# Patient Record
(demographics unavailable — no encounter records)

---

## 2025-04-24 NOTE — HISTORY OF PRESENT ILLNESS
[Eats healthy meals and snacks] : eats healthy meals and snacks [Eats meals with family] : eats meals with family [Normal] : Normal [Has Friends] : has friends [Grade ___] : Grade [unfilled] [Adequate social interactions] : adequate social interactions [No difficulties with Homework] : no difficulties with homework [No] : No cigarette smoke exposure [Brushing teeth twice/d] : brushing teeth twice per day [Yes] : Patient goes to dentist yearly [Toothpaste] : Primary Fluoride Source: Toothpaste [Playtime (60 min/d)] : playtime 60 min a day [Participates in after-school activities] : participates in after-school activities [Appropiate parent-child-sibling interaction] : appropriate parent-child-sibling interaction [Appropriately restrained in motor vehicle] : appropriately restrained in motor vehicle [Supervised outdoor play] : supervised outdoor play [Supervised around water] : supervised around water [Wears helmet and pads] : wears helmet and pads [Parent knows child's friends] : parent knows child's friends [Family discusses home emergency plan] : family discusses home emergency plan [Up to date] : Up to date [Exposure to electronic nicotine delivery system] : No exposure to electronic nicotine delivery system [FreeTextEntry7] : 8yr well visit  [de-identified] : soccer  [FreeTextEntry1] : No longer in speech allergies to pollen only dx at allergist, no meds doing well no concerns

## 2025-04-24 NOTE — PLAN
[TextEntry] : The following anticipatory guidance topics were discussed and/or handouts given: school readiness, mental health, nutrition and physical activity, oral health and safety. Counseling for nutrition and physical activity was provided. Anticipatory guidance addressed as per the history of present illness section.    SCHOOL READINESS: Discussed structured learning experiences, opportunities to socialize with other children, fears, friends, fluency.   HEALTHY PERSONAL HABITS: Discussed developing healthy personal habits (daily routines that promote health).   CHILD/FAMILY COMMUNITY INVOLVEMENT: Discussed child and family involvement and safety in the community - activities outside of the home, community projects, educational programs, relating to peers and adults.   PHYSICAL ACTIVITY: Discussed television/media, limits on viewing, promotion of physical activity and safe play.   DENTAL: Discussed visit with dentist.   SAFETY: Discussed belt positioning, booster seats, supervision, outdoor/pool safety.   5-2-1-0 questionnaire reviewed, parent(s) have no issues or concerns.   Return for next routine visit or sooner if questions/concerns arise.